# Patient Record
Sex: FEMALE | Race: WHITE
[De-identification: names, ages, dates, MRNs, and addresses within clinical notes are randomized per-mention and may not be internally consistent; named-entity substitution may affect disease eponyms.]

---

## 2017-03-06 ENCOUNTER — HOSPITAL ENCOUNTER (OUTPATIENT)
Dept: HOSPITAL 53 - M LDO | Age: 27
Discharge: HOME | End: 2017-03-06
Attending: MIDWIFE
Payer: OTHER GOVERNMENT

## 2017-03-06 VITALS — SYSTOLIC BLOOD PRESSURE: 124 MMHG | DIASTOLIC BLOOD PRESSURE: 67 MMHG

## 2017-03-06 VITALS — HEIGHT: 67 IN | WEIGHT: 165.35 LBS | BODY MASS INDEX: 25.95 KG/M2

## 2017-03-06 VITALS — DIASTOLIC BLOOD PRESSURE: 77 MMHG | SYSTOLIC BLOOD PRESSURE: 124 MMHG

## 2017-03-06 DIAGNOSIS — Z3A.31: ICD-10-CM

## 2017-03-06 DIAGNOSIS — O47.03: Primary | ICD-10-CM

## 2017-03-06 NOTE — IPNPDOC
Obstetrical Progress Note


Date of Service


The patient was seen on 3/6/17 at 14:52.





Progress Note


[id number] @ 1450





25 yo  @ 31+0 by LMP([id number]) presents to L&D Triage with c/o CTXs 

since early today.  





S: She reports she does not feel pain, so she has to palpate her abdomen to 

know if she is having CTXs.  Reports she has low back pain.





O:  VS- WNL, afebrile


     FHR- BL- 140, moderate variability, + accels(15 x 15), no decels


     CTX- none noted, resting tone palpated as soft, her abdomen does not get 

hard to palpation when she reports she is having cramping


     PMH- Cervical CA, asthma


     PSH- Cold cone cut ; laparoscopy 


     STD- Denies


     SVE- closed/thick/high, posterior/firm





A: 25 yo  @ 31+0 with no CTX noted on tracing and no CTX palpated.  

Reactive and Reassuring NST.





P: Discharge to home with strict return precautions.  CTXs getting closer 

together or increasing in intensity, LOF, DFM or VB. Pt verbalized 

understanding.  Pt plans to discuss early GBS testing and possible 

betamethasone with Dr. Johnson at visit on Thursday.





VS, I&O, 24H, Fishbone


Vital Signs/I&O





 Vital Signs








  Date Time  Temp Pulse Resp B/P Pulse Ox O2 Delivery O2 Flow Rate FiO2


 


3/6/17 14:08 98.3 118 20 124/67  Room Air  














BART FLORES CNM Mar 6, 2017 15:04

## 2017-04-21 ENCOUNTER — HOSPITAL ENCOUNTER (INPATIENT)
Dept: HOSPITAL 53 - M LDO | Age: 27
LOS: 2 days | Discharge: HOME | End: 2017-04-23
Attending: OBSTETRICS & GYNECOLOGY | Admitting: STUDENT IN AN ORGANIZED HEALTH CARE EDUCATION/TRAINING PROGRAM
Payer: OTHER GOVERNMENT

## 2017-04-21 VITALS — SYSTOLIC BLOOD PRESSURE: 101 MMHG | DIASTOLIC BLOOD PRESSURE: 55 MMHG

## 2017-04-21 VITALS — SYSTOLIC BLOOD PRESSURE: 99 MMHG | DIASTOLIC BLOOD PRESSURE: 56 MMHG

## 2017-04-21 VITALS — DIASTOLIC BLOOD PRESSURE: 56 MMHG | SYSTOLIC BLOOD PRESSURE: 94 MMHG

## 2017-04-21 VITALS — DIASTOLIC BLOOD PRESSURE: 59 MMHG | SYSTOLIC BLOOD PRESSURE: 100 MMHG

## 2017-04-21 VITALS — SYSTOLIC BLOOD PRESSURE: 98 MMHG | DIASTOLIC BLOOD PRESSURE: 55 MMHG

## 2017-04-21 VITALS — DIASTOLIC BLOOD PRESSURE: 50 MMHG | SYSTOLIC BLOOD PRESSURE: 93 MMHG

## 2017-04-21 VITALS — SYSTOLIC BLOOD PRESSURE: 109 MMHG | DIASTOLIC BLOOD PRESSURE: 59 MMHG

## 2017-04-21 VITALS — DIASTOLIC BLOOD PRESSURE: 51 MMHG | SYSTOLIC BLOOD PRESSURE: 92 MMHG

## 2017-04-21 VITALS — DIASTOLIC BLOOD PRESSURE: 59 MMHG | SYSTOLIC BLOOD PRESSURE: 110 MMHG

## 2017-04-21 VITALS — DIASTOLIC BLOOD PRESSURE: 69 MMHG | SYSTOLIC BLOOD PRESSURE: 115 MMHG

## 2017-04-21 VITALS — DIASTOLIC BLOOD PRESSURE: 57 MMHG | SYSTOLIC BLOOD PRESSURE: 106 MMHG

## 2017-04-21 VITALS — SYSTOLIC BLOOD PRESSURE: 120 MMHG | DIASTOLIC BLOOD PRESSURE: 52 MMHG

## 2017-04-21 VITALS — SYSTOLIC BLOOD PRESSURE: 106 MMHG | DIASTOLIC BLOOD PRESSURE: 57 MMHG

## 2017-04-21 VITALS — SYSTOLIC BLOOD PRESSURE: 107 MMHG | DIASTOLIC BLOOD PRESSURE: 62 MMHG

## 2017-04-21 VITALS — DIASTOLIC BLOOD PRESSURE: 66 MMHG | SYSTOLIC BLOOD PRESSURE: 113 MMHG

## 2017-04-21 VITALS — SYSTOLIC BLOOD PRESSURE: 110 MMHG | DIASTOLIC BLOOD PRESSURE: 65 MMHG

## 2017-04-21 VITALS — DIASTOLIC BLOOD PRESSURE: 54 MMHG | SYSTOLIC BLOOD PRESSURE: 96 MMHG

## 2017-04-21 VITALS — DIASTOLIC BLOOD PRESSURE: 55 MMHG | SYSTOLIC BLOOD PRESSURE: 101 MMHG

## 2017-04-21 VITALS — SYSTOLIC BLOOD PRESSURE: 96 MMHG | DIASTOLIC BLOOD PRESSURE: 53 MMHG

## 2017-04-21 VITALS — WEIGHT: 165.35 LBS | HEIGHT: 67 IN | BODY MASS INDEX: 25.95 KG/M2

## 2017-04-21 VITALS — DIASTOLIC BLOOD PRESSURE: 50 MMHG | SYSTOLIC BLOOD PRESSURE: 92 MMHG

## 2017-04-21 VITALS — SYSTOLIC BLOOD PRESSURE: 98 MMHG | DIASTOLIC BLOOD PRESSURE: 58 MMHG

## 2017-04-21 VITALS — SYSTOLIC BLOOD PRESSURE: 105 MMHG | DIASTOLIC BLOOD PRESSURE: 65 MMHG

## 2017-04-21 VITALS — SYSTOLIC BLOOD PRESSURE: 110 MMHG | DIASTOLIC BLOOD PRESSURE: 59 MMHG

## 2017-04-21 VITALS — SYSTOLIC BLOOD PRESSURE: 119 MMHG | DIASTOLIC BLOOD PRESSURE: 66 MMHG

## 2017-04-21 VITALS — SYSTOLIC BLOOD PRESSURE: 110 MMHG | DIASTOLIC BLOOD PRESSURE: 57 MMHG

## 2017-04-21 VITALS — SYSTOLIC BLOOD PRESSURE: 113 MMHG | DIASTOLIC BLOOD PRESSURE: 66 MMHG

## 2017-04-21 VITALS — DIASTOLIC BLOOD PRESSURE: 55 MMHG | SYSTOLIC BLOOD PRESSURE: 106 MMHG

## 2017-04-21 VITALS — SYSTOLIC BLOOD PRESSURE: 110 MMHG | DIASTOLIC BLOOD PRESSURE: 60 MMHG

## 2017-04-21 VITALS — DIASTOLIC BLOOD PRESSURE: 55 MMHG | SYSTOLIC BLOOD PRESSURE: 98 MMHG

## 2017-04-21 VITALS — SYSTOLIC BLOOD PRESSURE: 98 MMHG | DIASTOLIC BLOOD PRESSURE: 61 MMHG

## 2017-04-21 VITALS — DIASTOLIC BLOOD PRESSURE: 56 MMHG | SYSTOLIC BLOOD PRESSURE: 109 MMHG

## 2017-04-21 VITALS — DIASTOLIC BLOOD PRESSURE: 72 MMHG | SYSTOLIC BLOOD PRESSURE: 120 MMHG

## 2017-04-21 VITALS — DIASTOLIC BLOOD PRESSURE: 65 MMHG | SYSTOLIC BLOOD PRESSURE: 121 MMHG

## 2017-04-21 VITALS — SYSTOLIC BLOOD PRESSURE: 102 MMHG | DIASTOLIC BLOOD PRESSURE: 64 MMHG

## 2017-04-21 VITALS — DIASTOLIC BLOOD PRESSURE: 59 MMHG | SYSTOLIC BLOOD PRESSURE: 96 MMHG

## 2017-04-21 VITALS — DIASTOLIC BLOOD PRESSURE: 46 MMHG | SYSTOLIC BLOOD PRESSURE: 90 MMHG

## 2017-04-21 VITALS — DIASTOLIC BLOOD PRESSURE: 63 MMHG | SYSTOLIC BLOOD PRESSURE: 102 MMHG

## 2017-04-21 VITALS — SYSTOLIC BLOOD PRESSURE: 107 MMHG | DIASTOLIC BLOOD PRESSURE: 67 MMHG

## 2017-04-21 VITALS — SYSTOLIC BLOOD PRESSURE: 123 MMHG | DIASTOLIC BLOOD PRESSURE: 67 MMHG

## 2017-04-21 VITALS — DIASTOLIC BLOOD PRESSURE: 55 MMHG | SYSTOLIC BLOOD PRESSURE: 110 MMHG

## 2017-04-21 VITALS — DIASTOLIC BLOOD PRESSURE: 57 MMHG | SYSTOLIC BLOOD PRESSURE: 96 MMHG

## 2017-04-21 VITALS — DIASTOLIC BLOOD PRESSURE: 63 MMHG | SYSTOLIC BLOOD PRESSURE: 107 MMHG

## 2017-04-21 DIAGNOSIS — Z85.41: ICD-10-CM

## 2017-04-21 DIAGNOSIS — Z3A.37: ICD-10-CM

## 2017-04-21 LAB
BASE EXCESS BLDCOA CALC-SCNC: -8 MMOL/L
BASE EXCESS BLDCOV CALC-SCNC: -4 MMOL/L
CO2 BLDCOA CALC-SCNC: 21 MEQ/L
CO2 BLDCOV CALC-SCNC: 22.1 MEQ/L
ERYTHROCYTE [DISTWIDTH] IN BLOOD BY AUTOMATED COUNT: 16 % (ref 11.5–14.5)
HCO3 STD BLDCOA-SCNC: 16.4 MEQ/L
HCO3 STD BLDCOA-SCNC: 19.5 MEQ/L
HCO3 STD BLDCOV-SCNC: 20.2 MEQ/L
HCO3 STD BLDCOV-SCNC: 21 MEQ/L
MCH RBC QN AUTO: 24.8 PG (ref 27–33)
MCHC RBC AUTO-ENTMCNC: 32.3 G/DL (ref 32–36.5)
MCV RBC AUTO: 77 FL (ref 80–96)
PCO2 BLDCOA: 47.6 MMHG
PCO2 BLDCOV: 38.2 MMHG
PH BLDCOA: 7.23 UNITS
PH BLDCOV: 7.36 UNITS
PLATELET # BLD AUTO: 153 K/MM3 (ref 150–450)
PO2 BLDCOA: 13.4 MMHG
PO2 BLDCOV: 25.3 MMHG
SAO2 % BLDCOA: 16.5 %
SAO2 % BLDCOV: 58.8 %
WBC # BLD AUTO: 10.8 K/MM3 (ref 4–10)

## 2017-04-21 RX ADMIN — Medication PRN MG: at 18:30

## 2017-04-21 RX ADMIN — SODIUM CHLORIDE, POTASSIUM CHLORIDE, SODIUM LACTATE AND CALCIUM CHLORIDE SCH MLS/HR: 600; 310; 30; 20 INJECTION, SOLUTION INTRAVENOUS at 10:20

## 2017-04-21 RX ADMIN — Medication PRN MG: at 18:37

## 2017-04-21 RX ADMIN — SODIUM CHLORIDE, POTASSIUM CHLORIDE, SODIUM LACTATE AND CALCIUM CHLORIDE SCH MLS/HR: 600; 310; 30; 20 INJECTION, SOLUTION INTRAVENOUS at 05:00

## 2017-04-21 RX ADMIN — Medication PRN MG: at 18:25

## 2017-04-22 VITALS — DIASTOLIC BLOOD PRESSURE: 58 MMHG | SYSTOLIC BLOOD PRESSURE: 102 MMHG

## 2017-04-22 VITALS — DIASTOLIC BLOOD PRESSURE: 59 MMHG | SYSTOLIC BLOOD PRESSURE: 117 MMHG

## 2017-04-22 VITALS — DIASTOLIC BLOOD PRESSURE: 70 MMHG | SYSTOLIC BLOOD PRESSURE: 108 MMHG

## 2017-04-22 LAB
ERYTHROCYTE [DISTWIDTH] IN BLOOD BY AUTOMATED COUNT: 16.5 % (ref 11.5–14.5)
MCH RBC QN AUTO: 25.7 PG (ref 27–33)
MCHC RBC AUTO-ENTMCNC: 32.5 G/DL (ref 32–36.5)
MCV RBC AUTO: 79.2 FL (ref 80–96)
PLATELET # BLD AUTO: 163 K/MM3 (ref 150–450)
WBC # BLD AUTO: 20.5 K/MM3 (ref 4–10)

## 2017-04-22 RX ADMIN — ACETAMINOPHEN PRN MG: 500 TABLET ORAL at 15:10

## 2017-04-22 RX ADMIN — IBUPROFEN PRN MG: 800 TABLET, FILM COATED ORAL at 10:46

## 2017-04-22 RX ADMIN — IBUPROFEN PRN MG: 800 TABLET, FILM COATED ORAL at 01:29

## 2017-04-22 RX ADMIN — IBUPROFEN PRN MG: 800 TABLET, FILM COATED ORAL at 19:36

## 2017-04-22 RX ADMIN — DOCUSATE SODIUM PRN MG: 100 CAPSULE, LIQUID FILLED ORAL at 05:50

## 2017-04-22 RX ADMIN — DOCUSATE SODIUM PRN MG: 100 CAPSULE, LIQUID FILLED ORAL at 22:12

## 2017-04-22 RX ADMIN — ACETAMINOPHEN PRN MG: 500 TABLET ORAL at 05:50

## 2017-04-22 RX ADMIN — PRENATAL WITH FERROUS FUM AND FOLIC ACID SCH TAB: 3080; 920; 120; 400; 22; 1.84; 3; 20; 10; 1; 12; 200; 27; 25; 2 TABLET ORAL at 08:02

## 2017-04-22 NOTE — IPN
DATE:  2017

 

Postpartum day #1.

 

This lady is a 26-year-old  6, para 3 admitted, spontaneous rupture of

membranes, at 37 and 4 weeks of gestation.  She has a history of cervical cancer,

was treated by cold knife cone biopsy and has been followed up ever since.  She

eventually got to full dilatation in the POP position, deflexed requiring a Kiwi

pull to bring it under the symphysis pubis, removed the Kiwi and then had a

spontaneous vaginal delivery of a male infant, 7 pounds 9 ounces,  3440 grams,

Apgar scores of 8 and 9 at one and five minutes respectively.  The arterial pH

was 7.23, base excess -8.0, venous pH 7.35, base excess -4.0.  Her admitting

hemoglobin was 10.3, hematocrit 32.0, platelets 153.  Postpartum day #1

hemoglobin 10.0, hematocrit 30.8 and platelets 163.

 

Vital signs today:  102/58 is blood pressure, respirations 18, pulse 81,

temperature is  98.3.

 

We discussed phlebitis, cystitis, mastitis, endometritis, cellulitis, diet,

exercise, pain management, perineal, breast and wound care.  The patient is

requesting oral contraceptives.  We will give it to her, and she is to start at

her 6-week checkup.

 

The rest of the examination is unremarkable.  She is normocephalic, atraumatic.

Neck:  Full range of motion.  Pupils equal and reactive to light.  Distal pulses

are symmetric.  No evidence of deep vein thrombosis (DVT), pulmonary embolism

(PE) or superficial phlebitis.  No wheezes or rhonchi.  Lungs are clear

bilaterally bases.  Uterus nontender, 2 below.  Lochia is moderate.  Perineum is

intact.  She has no rashes, lesions or pruritus.  No arthralgia, myalgia,

complaints of cough, wheezes, shortness of breath or dyspnea on exertion.  No

chest pain.  No bleeding.  Neurologic complete.  No incontinence, urgency or

frequency.  No nausea, vomiting, diarrhea or constipation.

 

GYN history is regards to her cervical cancer.

 

Past medical and surgical history is unremarkable.

 

No tobacco or alcohol.  No drug abuse.  She is .  There is no domestic

violence.

 

In summary, we have a 37 and 4 week patient who delivered a live birth male

infant.  Plan to discharge tomorrow. Medications giving with oral contraceptives.

 

 

Copy To: [facility]

## 2017-04-22 NOTE — DN
DATE OF SERVICE:  2017

 

This lady was admitted with spontaneous rupture of membranes and 37 and 4 weeks

of gestation.  She had a history of cervical cancer and treatment and she came in

with spontaneous rupture of membranes, clear liquor, afebrile.  She received

Pitocin and an epidural and eventually had full dilatation.  She was in the POP

position with the presenting part attempting to get underneath the symphysis

pubis.  There was a period of time of some fetal bradycardia with recovery with

fetal scalp stimulation and oxygen and at this point we elected to use a vacuum

to bring the baby deflecting it down underneath the symphysis pubis in order for

her to have spontaneous delivery.

 

After discussing risks and benefits of vacuum, the patient and  agreed.

The patient was fully dilated.  The vacuum was placed on the scalp and ensuring

that it was in the midline sagittal suture with one contraction at 5 mmHg, we

pulled the baby under the symphysis pubis and removed the vacuum and the patient

had a spontaneous delivery of a live birth male infant in the POP position

weighing 7 pounds 9 ounces, 3440 grams, Apgar scores of 8 and 9 at one and five

minutes respectively.

 

There was terminal meconium at delivery.  Arterial and venous pH was performed.

Three vessels in the cord.  Membranes and tissues intact.  The patient

spontaneously pushed out the placenta.  Examination of the vagina, the cervix,

the anterior and posterior walls, and bladder and rectal area.  Sphincter was

intact.  No evidence to suggest any trauma vaginally.  Uterus contracted well

down on Pitocin.  The patient had one episode of fever of 100.2 and then 102.1,

which we believe is related to prolonged pushing, prolonged rupture of membranes

and subsequently resolved.

 

We gave her some Tylenol for the fever and neonatology was informed.  The patient

and baby tolerating procedure well.

## 2017-04-23 VITALS — DIASTOLIC BLOOD PRESSURE: 59 MMHG | SYSTOLIC BLOOD PRESSURE: 120 MMHG

## 2017-04-23 RX ADMIN — IBUPROFEN PRN MG: 800 TABLET, FILM COATED ORAL at 05:03

## 2017-04-23 RX ADMIN — PRENATAL WITH FERROUS FUM AND FOLIC ACID SCH TAB: 3080; 920; 120; 400; 22; 1.84; 3; 20; 10; 1; 12; 200; 27; 25; 2 TABLET ORAL at 08:01

## 2018-10-02 ENCOUNTER — HOSPITAL ENCOUNTER (OUTPATIENT)
Dept: HOSPITAL 53 - M SDC | Age: 28
Discharge: HOME | End: 2018-10-02
Attending: OBSTETRICS & GYNECOLOGY
Payer: COMMERCIAL

## 2018-10-02 DIAGNOSIS — N87.0: Primary | ICD-10-CM

## 2018-10-02 DIAGNOSIS — F41.9: ICD-10-CM

## 2018-10-02 DIAGNOSIS — Z79.899: ICD-10-CM

## 2018-10-02 DIAGNOSIS — F32.9: ICD-10-CM

## 2018-10-02 LAB
CONTROL LINE HCG: (no result)
HCG, SERUM QUALITATIVE: NEGATIVE
HEMATOCRIT: 40.4 % (ref 36–47)
HEMOGLOBIN: 13.2 G/DL (ref 12–15.5)

## 2018-10-02 PROCEDURE — 58555 HYSTEROSCOPY DX SEP PROC: CPT

## 2018-10-02 RX ADMIN — IODINE SOLUTION STRONG 5% (LUGOL'S) 1 ML: 5 SOLUTION at 08:00

## 2018-10-02 RX ADMIN — LIDOCAINE HYDROCHLORIDE,EPINEPHRINE BITARTRATE 1 ML: 10; .01 INJECTION, SOLUTION INFILTRATION; PERINEURAL at 07:09

## 2018-10-02 RX ADMIN — SODIUM CHLORIDE, POTASSIUM CHLORIDE, SODIUM LACTATE AND CALCIUM CHLORIDE 1 MLS/HR: 600; 310; 30; 20 INJECTION, SOLUTION INTRAVENOUS at 06:37
